# Patient Record
Sex: MALE | Race: BLACK OR AFRICAN AMERICAN | NOT HISPANIC OR LATINO | Employment: STUDENT | URBAN - METROPOLITAN AREA
[De-identification: names, ages, dates, MRNs, and addresses within clinical notes are randomized per-mention and may not be internally consistent; named-entity substitution may affect disease eponyms.]

---

## 2020-04-12 ENCOUNTER — NURSE TRIAGE (OUTPATIENT)
Dept: NURSING | Facility: CLINIC | Age: 25
End: 2020-04-12

## 2020-04-12 NOTE — TELEPHONE ENCOUNTER
"    Reason for Disposition    [1] MILD pain (e.g., does not interfere with normal activities) AND [2] pain comes and goes (cramps) [3] present > 48 hours    Additional Information    Negative: Shock suspected (e.g., cold/pale/clammy skin, too weak to stand, low BP, rapid pulse)    Negative: Difficult to awaken or acting confused (e.g., disoriented, slurred speech)    Negative: Passed out (i.e., lost consciousness, collapsed and was not responding)    Negative: Sounds like a life-threatening emergency to the triager    Negative: Chest pain    Negative: Pain is mainly in upper abdomen  (if needed ask: \"is it mainly above the belly button?\")    Negative: Followed an abdomen (stomach) injury    Negative: [1] SEVERE pain (e.g., excruciating) AND [2] present > 1 hour    Negative: [1] SEVERE pain AND [2] age > 60    Negative: [1] Vomiting AND [2] contains red blood or black (\"coffee ground\") material  (Exception: few red streaks in vomit that only happened once)    Negative: Blood in bowel movements  (Exception: Blood on surface of BM with constipation)    Negative: Black or tarry bowel movements  (Exception: chronic-unchanged  black-grey bowel movements AND is taking iron pills or Pepto-bismol)    Negative: [1] Unable to urinate (or only a few drops) > 4 hours AND [2] bladder feels very full (e.g., palpable bladder or strong urge to urinate)    Negative: [1] Pain in the scrotum or testicle AND [2] present > 1 hour    Negative: Patient sounds very sick or weak to the triager    Negative: [1] MILD-MODERATE pain AND [2] constant AND [3] present > 2 hours    Negative: [1] Vomiting AND [2] abdomen looks much more swollen than usual    Negative: [1] Vomiting AND [2] contains bile (green color)    Negative: White of the eyes have turned yellow (i.e., jaundice)    Negative: Fever > 103 F (39.4 C)    Negative: [1] Fever > 101 F (38.3 C) AND [2] age > 60    Negative: [1] Fever > 100.0 F (37.8 C) AND [2] bedridden (e.g., nursing " home patient, CVA, chronic illness, recovering from surgery)    Negative: [1] Fever > 100.0 F (37.8 C) AND [2] diabetes mellitus or weak immune system (e.g., HIV positive, cancer chemo, splenectomy, chronic steroids)    Negative: [1] SEVERE pain AND [2] present < 1 hour    Negative: [1] MODERATE pain (e.g., interferes with normal activities) AND [2] pain comes and goes (cramps) AND [3] present > 24 hours  (Exception: pain with Vomiting or Diarrhea - see that Guideline)    Protocols used: ABDOMINAL PAIN - MALE-A-AH    Patient reports mild right-sided, lower abdominal pain for the past 2 days.  He denies vomiting, he denies problems with stools/urination, and reports pain as intermittent.  Per guideline, patient is to see a provider within 24 hours.  Caller was warm transferred to scheduling to obtain a telephone visit with an available provider.    Princess Stevenson RN  [facility]

## 2020-04-13 ENCOUNTER — VIRTUAL VISIT (OUTPATIENT)
Dept: FAMILY MEDICINE | Facility: CLINIC | Age: 25
End: 2020-04-13

## 2020-04-13 ENCOUNTER — NURSE TRIAGE (OUTPATIENT)
Dept: NURSING | Facility: CLINIC | Age: 25
End: 2020-04-13

## 2020-04-13 DIAGNOSIS — K59.01 SLOW TRANSIT CONSTIPATION: Primary | ICD-10-CM

## 2020-04-13 DIAGNOSIS — Z87.2 HISTORY OF FOLLICULITIS: ICD-10-CM

## 2020-04-13 DIAGNOSIS — Z20.2 POTENTIAL EXPOSURE TO STD: ICD-10-CM

## 2020-04-13 PROCEDURE — 99203 OFFICE O/P NEW LOW 30 MIN: CPT | Mod: 95 | Performed by: FAMILY MEDICINE

## 2020-04-13 NOTE — PATIENT INSTRUCTIONS
Try Miralax, followed by Citracel. Followup if increased pain, fever; well exam 4-6 months, sooner if symptoms.

## 2020-04-13 NOTE — PROGRESS NOTES
"Angel Martin is a 24 year old male who is being evaluated via a billable phone visit.      The patient has been notified of following:     \"This phone visit will be conducted via a call between you and your physician/provider. We have found that certain health care needs can be provided without the need for an in-person physical exam.  This service lets us provide the care you need with a phone conversation.  If a prescription is necessary we can send it directly to your pharmacy.  If lab work is needed we can place an order for that and you can then stop by our lab to have the test done at a later time.    Phone visits are billed at different rates depending on your insurance coverage.  Please reach out to your insurance provider with any questions.    If during the course of the call the physician/provider feels a phone visit is not appropriate, you will not be charged for this service.\"    Patient has given verbal consent for Phone visit? Yes    How would you like to obtain your AVS? Thelma    Patient would like the phone invitation sent by: Text to cell phone: [phone]      Subjective     Angel Martin is a 24 year old male who presents to clinic today for the following health issues:    Constipation     Onset: several weeks     Description:   Frequency of bowel movements: 2-3 days.  Stool consistency: large caliber    Progression of Symptoms:  intermittent    Accompanying Signs & Symptoms:  Abdominal pain (cramping?): YES  Blood in stool: no   Rectal pain: no   Nausea/vomiting: no   Weight loss or gain: no    History:   History of abdominal surgery: no     Precipitating factors:   Recent use of narcotics, anticholinergics, calcium channel blockers, antacids, or iron supplements: no   Chronic Laxative Use: no          Therapies Tried and outcome: none      Reviewed and updated as needed this visit by Provider         Review of Systems         Objective    There were no vitals taken for this " "visit.  Estimated body mass index is 30.71 kg/m  as calculated from the following:    Height as of 4/4/14: 1.956 m (6' 5\").    Weight as of 4/4/14: 117.5 kg (259 lb).  Physical Exam   GENERAL: healthy, alert and no distress  PSYCH: mentation appears normal, affect normal/bright    Diagnostic Test Results:  Labs reviewed in Epic        Assessment & Plan     1. Slow transit constipation  recent    2. History of folliculitis  Back shaving groin    3. Potential exposure to STD  Years ago    Patient Instructions   Try Miralax, followed by Citracel. Followup if increased pain, fever; well exam 4-6 months, sooner if symptoms.     Kris Tabares MD  [facility] [place]    Kris Tabares MD      "

## 2020-04-13 NOTE — TELEPHONE ENCOUNTER
Found hard lump under R armpit - hurts to push on it or squeeze it - which went away on it's own. Patient had a virtual visit today for abdominal pain and is wondering if it could be related at all? Or a different issue that needs addressing?    Per protocol, advised homecare and monitoring - however, advised patient that message would be sent to provider who performed virtual visit to see if there are any other recommendations.    Ambika Chaudhary RN on 4/13/2020 at 6:31 PM      Additional Information    Negative: Small growth, spot, bump, or pigmented area of skin (e.g., moles, skin tags, wart, melanoma, skin cancer)    Negative: Inguinal hernia previously diagnosed by a physician    Negative: Followed a skin injury    Negative: Follows an insect bite    Negative: Swelling of lymph node suspected    Negative: Swelling of vaccination site    Negative: Swelling of tongue    Negative: Swelling of lip    Negative: Swelling of eye    Negative: Swelling of entire face    Negative: Swelling of scrotum    Negative: Swelling of labia    Negative: Swelling of surgical incision    Negative: Swelling of ankle joint    Negative: Swelling of elbow joint    Negative: Swelling of knee joint    Negative: Swelling with a skin rash    Negative: Patient sounds very sick or weak to the triager    Negative: SEVERE pain (e.g., excruciating)    Negative: [1] Swelling is painful to touch AND [2] fever    Negative: [1] Swelling is red AND [2] fever    Negative: [1] Swelling is red AND [2] size > 2 inches (5.0 cm) (Exception: itchy area of skin)    Negative: [1] Swelling of groin (inguinal area) AND [2] painful    Negative: [1] Swelling is painful to touch AND [2] no fever    Negative: Looks like a boil, infected sore, deep ulcer or other infected rash    Negative: [1] Small swelling or lump AND [2] unexplained AND [3] present > 1 week    Negative: [1] New-onset hernia suspected (reducible bulge in groin or abdomen; non-tender) AND [2] NO pain  "or vomiting    [1] Small swelling or lump AND [2] unexplained AND [3] present < 1 week    Answer Assessment - Initial Assessment Questions  1. APPEARANCE of SWELLING: \"What does it look like?\" (e.g., lymph node, insect bite, mole)      Was red, slightly raised - terrell hard  2. SIZE: \"How large is the swelling?\" (inches, cm or compare to coins)      Quarter-sized  3. LOCATION: \"Where is the swelling located?\"      R armpit  4. ONSET: \"When did the swelling start?\"      Just noticed it now  5. PAIN: \"Is it painful?\" If so, ask: \"How much?\"      Painful when push on it or squeeze it - but doesn't hurt to touch  6. ITCH: \"Does it itch?\" If so, ask: \"How much?\"      no  7. CAUSE: \"What do you think caused the swelling?\"      ?  8. OTHER SYMPTOMS: \"Do you have any other symptoms?\" (e.g., fever)      no    Protocols used: SKIN LUMP OR LOCALIZED SWELLING-A-AH      "

## 2020-04-15 NOTE — TELEPHONE ENCOUNTER
Spoke with pt, the lump opened and drained, he will continue to monitor, use warm, moist compresses a couple times daily, if any worsening he will call for further advise    Cathie Zelaya RN   [facility]

## 2020-04-15 NOTE — TELEPHONE ENCOUNTER
Agree with observing at home, warm, moist compress twice daily as needed. Usually resolve over next 6-8 weeks. Contact sooner if fever, increasing size or pain. Please notify, thanks Kris

## 2020-04-15 NOTE — TELEPHONE ENCOUNTER
Attempted to contact pt, mailbox is not set up, unable to leave a message    Cathie Zelaya RN   [facility]

## 2024-05-22 ENCOUNTER — OFFICE VISIT (OUTPATIENT)
Dept: FAMILY MEDICINE | Facility: CLINIC | Age: 29
End: 2024-05-22
Payer: COMMERCIAL

## 2024-05-22 VITALS
OXYGEN SATURATION: 98 % | HEART RATE: 73 BPM | WEIGHT: 268.5 LBS | TEMPERATURE: 97.5 F | SYSTOLIC BLOOD PRESSURE: 109 MMHG | HEIGHT: 77 IN | RESPIRATION RATE: 11 BRPM | DIASTOLIC BLOOD PRESSURE: 72 MMHG | BODY MASS INDEX: 31.7 KG/M2

## 2024-05-22 DIAGNOSIS — Z11.4 SCREENING FOR HIV (HUMAN IMMUNODEFICIENCY VIRUS): Primary | ICD-10-CM

## 2024-05-22 DIAGNOSIS — Z13.1 SCREENING FOR DIABETES MELLITUS: ICD-10-CM

## 2024-05-22 DIAGNOSIS — Z11.3 ROUTINE SCREENING FOR STI (SEXUALLY TRANSMITTED INFECTION): ICD-10-CM

## 2024-05-22 DIAGNOSIS — J45.990 EXERCISE-INDUCED ASTHMA: ICD-10-CM

## 2024-05-22 DIAGNOSIS — Z13.220 LIPID SCREENING: ICD-10-CM

## 2024-05-22 DIAGNOSIS — Z12.5 SCREENING FOR PROSTATE CANCER: ICD-10-CM

## 2024-05-22 DIAGNOSIS — Z11.59 NEED FOR HEPATITIS C SCREENING TEST: ICD-10-CM

## 2024-05-22 DIAGNOSIS — Z23 NEED FOR VACCINATION: ICD-10-CM

## 2024-05-22 DIAGNOSIS — Z00.00 ROUTINE GENERAL MEDICAL EXAMINATION AT A HEALTH CARE FACILITY: ICD-10-CM

## 2024-05-22 DIAGNOSIS — Z90.5 SOLITARY KIDNEY, ACQUIRED: ICD-10-CM

## 2024-05-22 LAB — HOLD SPECIMEN: NORMAL

## 2024-05-22 PROCEDURE — 36415 COLL VENOUS BLD VENIPUNCTURE: CPT | Performed by: FAMILY MEDICINE

## 2024-05-22 PROCEDURE — 81003 URINALYSIS AUTO W/O SCOPE: CPT | Performed by: FAMILY MEDICINE

## 2024-05-22 PROCEDURE — 87389 HIV-1 AG W/HIV-1&-2 AB AG IA: CPT | Performed by: FAMILY MEDICINE

## 2024-05-22 PROCEDURE — 80061 LIPID PANEL: CPT | Performed by: FAMILY MEDICINE

## 2024-05-22 PROCEDURE — 99385 PREV VISIT NEW AGE 18-39: CPT | Mod: 25 | Performed by: FAMILY MEDICINE

## 2024-05-22 PROCEDURE — 80053 COMPREHEN METABOLIC PANEL: CPT | Performed by: FAMILY MEDICINE

## 2024-05-22 PROCEDURE — 90715 TDAP VACCINE 7 YRS/> IM: CPT | Performed by: FAMILY MEDICINE

## 2024-05-22 PROCEDURE — 86780 TREPONEMA PALLIDUM: CPT | Performed by: FAMILY MEDICINE

## 2024-05-22 PROCEDURE — 90471 IMMUNIZATION ADMIN: CPT | Performed by: FAMILY MEDICINE

## 2024-05-22 PROCEDURE — 86803 HEPATITIS C AB TEST: CPT | Performed by: FAMILY MEDICINE

## 2024-05-22 PROCEDURE — 87491 CHLMYD TRACH DNA AMP PROBE: CPT | Performed by: FAMILY MEDICINE

## 2024-05-22 PROCEDURE — G0103 PSA SCREENING: HCPCS | Performed by: FAMILY MEDICINE

## 2024-05-22 PROCEDURE — 87591 N.GONORRHOEAE DNA AMP PROB: CPT | Performed by: FAMILY MEDICINE

## 2024-05-22 RX ORDER — ALBUTEROL SULFATE 90 UG/1
2 AEROSOL, METERED RESPIRATORY (INHALATION) EVERY 6 HOURS PRN
Qty: 18 G | Refills: 3 | Status: SHIPPED | OUTPATIENT
Start: 2024-05-22

## 2024-05-22 SDOH — HEALTH STABILITY: PHYSICAL HEALTH: ON AVERAGE, HOW MANY DAYS PER WEEK DO YOU ENGAGE IN MODERATE TO STRENUOUS EXERCISE (LIKE A BRISK WALK)?: 2 DAYS

## 2024-05-22 SDOH — HEALTH STABILITY: PHYSICAL HEALTH: ON AVERAGE, HOW MANY MINUTES DO YOU ENGAGE IN EXERCISE AT THIS LEVEL?: 60 MIN

## 2024-05-22 ASSESSMENT — PAIN SCALES - GENERAL: PAINLEVEL: NO PAIN (0)

## 2024-05-22 ASSESSMENT — SOCIAL DETERMINANTS OF HEALTH (SDOH): HOW OFTEN DO YOU GET TOGETHER WITH FRIENDS OR RELATIVES?: ONCE A WEEK

## 2024-05-22 NOTE — PROGRESS NOTES
"Preventive Care Visit  [facility]  Lalo Naqvi DO, Family Medicine  May 22, 2024      Assessment & Plan     Exercise-induced asthma  - albuterol (PROAIR HFA/PROVENTIL HFA/VENTOLIN HFA) 108 (90 Base) MCG/ACT inhaler; Inhale 2 puffs into the lungs every 6 hours as needed for shortness of breath or wheezing    Screening for HIV (human immunodeficiency virus)    Need for hepatitis C screening test  - Hepatitis C Screen Reflex to HCV RNA Quant and Genotype; Future  - Hepatitis C Screen Reflex to HCV RNA Quant and Genotype    Routine screening for STI (sexually transmitted infection)  - Treponema Abs w Reflex to RPR and Titer; Future  - HIV Antigen Antibody Combo Cascade; Future  - Chlamydia trachomatis/Neisseria gonorrhoeae by PCR; Future  - Treponema Abs w Reflex to RPR and Titer  - HIV Antigen Antibody Combo Cascade  - Chlamydia trachomatis/Neisseria gonorrhoeae by PCR    Lipid screening  - Lipid panel reflex to direct LDL Fasting; Future  - Lipid panel reflex to direct LDL Fasting    Screening for diabetes mellitus  - Comprehensive metabolic panel (BMP + Alb, Alk Phos, ALT, AST, Total. Bili, TP); Future  - Extra Purple Top EDTA (LAB USE ONLY); Future  - Comprehensive metabolic panel (BMP + Alb, Alk Phos, ALT, AST, Total. Bili, TP)  - Extra Purple Top EDTA (LAB USE ONLY)    Solitary kidney, acquired  - UA Macroscopic with reflex to Microscopic and Culture - Lab Collect; Future  - UA Macroscopic with reflex to Microscopic and Culture - Lab Collect    Need for vaccination  - TDAP 10-64Y (ADACEL,BOOSTRIX)    Routine general medical examination at a health care facility  - REVIEW OF HEALTH MAINTENANCE PROTOCOL ORDERS    Screening for prostate cancer  - PSA, screen; Future  - PSA, screen    BMI  Estimated body mass index is 32.06 kg/m  as calculated from the following:    Height as of this encounter: 1.949 m (6' 4.73\").    Weight as of this encounter: 121.8 kg (268 lb 8 oz).   Weight " management plan: Discussed healthy diet and exercise guidelines    Counseling  Appropriate preventive services were discussed with this patient, including applicable screening as appropriate for fall prevention, nutrition, physical activity, Tobacco-use cessation, weight loss and cognition.  Checklist reviewing preventive services available has been given to the patient.      Dee Ortiz is a 28 year old, presenting for the following:  Physical (STI panel) and Abdominal Pain (Abdominal pain in right side abdomen)        5/22/2024     3:08 PM   Additional Questions   Roomed by Millie TORRES        HPI    Abdominal pressure on right side and left upper quadrant   Went to ER a few years ago   Scan done   No  suspicious findings  Hasn't taken any medications for this     Bruised  kidney when young  Undescended testicle   It  was  removed      Eats once or twice a day               5/22/2024   General Health   How would you rate your overall physical health? Good   Feel stress (tense, anxious, or unable to sleep) Only a little   (!) STRESS CONCERN      5/22/2024   Nutrition   Three or more servings of calcium each day? (!) NO   Diet: Regular (no restrictions)   How many servings of fruit and vegetables per day? (!) 0-1   How many sweetened beverages each day? 0-1         5/22/2024   Exercise   Days per week of moderate/strenous exercise 2 days   Average minutes spent exercising at this level 60 min   (!) EXERCISE CONCERN      5/22/2024   Social Factors   Frequency of gathering with friends or relatives Once a week   Worry food won't last until get money to buy more No   Food not last or not have enough money for food? Yes   Do you have housing?  Yes   Are you worried about losing your housing? No   Lack of transportation? Yes   Unable to get utilities (heat,electricity)? No   (!) FOOD SECURITY CONCERN PRESENT (!) TRANSPORTATION CONCERN PRESENT      5/22/2024   Dental   Dentist two times every year? (!) NO          "5/22/2024   TB Screening   Were you born outside of the [place]? No         Today's PHQ-2 Score:       5/22/2024     2:59 PM   PHQ-2 ( 1999 Pfizer)   Q1: Little interest or pleasure in doing things 0   Q2: Feeling down, depressed or hopeless 0   PHQ-2 Score 0   Q1: Little interest or pleasure in doing things Not at all   Q2: Feeling down, depressed or hopeless Not at all   PHQ-2 Score 0           5/22/2024   Substance Use   Alcohol more than 3/day or more than 7/wk Not Applicable   Do you use any other substances recreationally? No     Social History     Tobacco Use    Smoking status: Never    Smokeless tobacco: Never   Vaping Use    Vaping status: Never Used   Substance Use Topics    Alcohol use: No    Drug use: No           5/22/2024   STI Screening   New sexual partner(s) since last STI/HIV test? (!) YES          5/22/2024   Contraception/Family Planning   Questions about contraception or family planning No        Reviewed and updated as needed this visit by Provider                       Objective    Exam  /72 (BP Location: Left arm, Patient Position: Sitting, Cuff Size: Adult Large)   Pulse 73   Temp 97.5  F (36.4  C) (Temporal)   Resp 11   Ht 1.949 m (6' 4.73\")   Wt 121.8 kg (268 lb 8 oz)   SpO2 98%   BMI 32.06 kg/m     Estimated body mass index is 32.06 kg/m  as calculated from the following:    Height as of this encounter: 1.949 m (6' 4.73\").    Weight as of this encounter: 121.8 kg (268 lb 8 oz).    Physical Exam  Constitutional:       General: He is not in acute distress.     Appearance: Normal appearance.   HENT:      Head: Normocephalic.      Right Ear: Tympanic membrane, ear canal and external ear normal.      Left Ear: Tympanic membrane, ear canal and external ear normal.      Mouth/Throat:      Mouth: Mucous membranes are moist.      Pharynx: No oropharyngeal exudate or posterior oropharyngeal erythema.   Eyes:      General: No scleral icterus.  Cardiovascular:      Rate and Rhythm: Normal " rate and regular rhythm.      Heart sounds: No murmur heard.  Pulmonary:      Effort: Pulmonary effort is normal. No respiratory distress.      Breath sounds: Normal breath sounds.   Abdominal:      General: Abdomen is flat. Bowel sounds are normal.      Palpations: Abdomen is soft.   Lymphadenopathy:      Cervical: No cervical adenopathy.   Neurological:      General: No focal deficit present.      Mental Status: He is alert.   Psychiatric:         Mood and Affect: Mood normal.         Behavior: Behavior normal.               Signed Electronically by: Lalo Naqvi DO

## 2024-05-22 NOTE — COMMUNITY RESOURCES LIST (ENGLISH)
May 22, 2024           YOUR PERSONALIZED LIST OF SERVICES & PROGRAMS           NAVIGATION    Eligibility Screening      [facility] application assistance - [facility] [place]  [address] [place] [address] (Distance: 0.9 miles)  Language: English  Fee: Free      [facility] Family Investment Program (MFIP)  [address] [place] [address] (Distance: 0.9 miles)  Language: English  Fee: Free      [place] - SNAP (formerly food stamps) Screening and Application help  Phone: ([phone]) [phone]  Website: https://www.[employer].org/programs/[place]-food-helpline/  Language: English  Hours: Mon 10:00 AM - 5:00 PM Tue 10:00 AM - 5:00 PM Wed 10:00 AM - 5:00 PM Thu 10:00 AM - 5:00 PM Fri 10:00 AM - 5:00 PM  Fee: Free  Accessibility: Ada accessible, Blind accommodation, Deaf or hard of hearing, Translation services        ASSISTANCE    Nutrition Benefits      [facility] application assistance - [facility] [place]  [address] [place] [address] (Distance: 0.9 miles)  Language: English  Fee: Free      [facility] application assistance [facility]  [address] [place] [address] (Distance: 0.9 miles)  Language: English  Fee: Free      [place] - SNAP (formerly food stamps) Screening and Application help  Phone: ([phone]  Website: https://www.[employer].org/programs/[place]-food-helpline/  Language: English  Hours: Mon 10:00 AM - 5:00 PM Tue 10:00 AM - 5:00 PM Wed 10:00 AM - 5:00 PM Thu 10:00 AM - 5:00 PM Fri 10:00 AM - 5:00 PM  Fee: Free  Accessibility: Ada accessible, Blind accommodation, Deaf or hard of hearing, Translation services    Pantry      [employer] - Food pantry  [address] [place] [address] (Distance: 4.7 miles)  Phone: [phone]) [phone]  Website: http://www.[employer].org/  Language:  English, Jamaican  Fee: Free  Accessibility: Ada accessible      [facility] - Food pantry  [address] [place] [address] [address] [place] [address] (Distance: 8.8 miles)  Phone: [phone]  Language: English, Jamaican, Stateless, Sri Lankan  Fee: Free      [employer] - [place] [employer] [employer] Shelf  Phone: ([phone]) [phone]  Website: www.[facility].org  Language: English, Jamaican  Hours: Mon 9:00 AM - 3:30 PM Tue 9:00 AM - 6:30 PM Wed 9:00 AM - 3:30 PM Thu 9:00 AM - 12:30 PM Fri 9:00 AM - 12:30 PM Sat 9:00 AM - 12:00 PM  Fee: Free            Medical Transportation, (NEMT)      [facility] - Transportation to medical appointments  [address] [place] [address] (Distance: 3.0 miles)  Phone: [phone]  Website: http://[email or web address]/  Language: English, Jamaican, Sri Lankan  Fee: Sliding scale  Accessibility: Translation services      [facility] - Transportation to medical appointments  [address] [place] [address] (Distance: 1.1 miles)  Phone: [phone]  Language: English, Jamaican, Sri Lankan  Fee: Free  Accessibility: Ada accessible, Translation services  Transportation Options: Free transportation      Social Service [facility] - Neighbor to Neighbor Program  Phone: (583) 585-9353  Email: zulema@[facility].org  Website: https://www.[facility].org/services/older-adults/-services/neighbor-to-neighbor  Language: English  Hours: Mon 8:00 AM - 5:00 PM Tue 8:00 AM - 5:00 PM Wed 8:00 AM - 5:00 PM Thu 8:00 AM - 5:00 PM Fri 8:00 AM - 5:00 PM  Fee: Insurance, Self pay  Accessibility: Deaf or hard of hearing, Blind accommodation, Translation services    Expense Assistance      [place] - Transit Link  [address] [place] [address] (Distance: 7.7 miles)  Phone: (805) 665-9376  Website: https://[employer].org/Transportation/Services/Transit-Link.aspx  Language: English  Fee: Self pay      Transit - [place] - Transit Assistance Program (TAP) -  Transportation expense assistance  [address] [place] [address] (Distance: 7.7 miles)  Language: English, Libyan  Fee: Free, Sliding scale, Self pay  Accessibility: Translation services      - Dislocated Worker/Adult [employer] Employment Program  Phone: (748) 584-8384  Email: analia@[employer]  Website: https://Jmdedu.commn"[employer]/services/employment-services/dislocated-worker-program/  Language: English, Martiniquais  Hours: Mon 8:00 AM - 4:30 PM Tue 8:00 AM - 4:30 PM Wed 8:00 AM - 4:30 PM Thu 8:00 AM - 4:30 PM Fri 8:00 AM - 4:30 PM  Fee: Free  Accessibility: Ada accessible    Coordination      Transportation - Ride coordination  [address] [place] [address] (Distance: 17.8 miles)  Website: https://POPAPP  Language: English  Fee: Self pay, Insurance  Accessibility: Ada accessible      [facility] - Bus Passes - Free or low-cost transportation  [address] [place] [address] (Distance: 2.0 miles)  Phone: (788) [phone]  Language: English  Fee: Free  Accessibility: Deaf or hard of hearing, Ada accessible      Ride Transportation - How [employer] works  Phone: (971) 533-6152  Website: https://www.[employer]/members/shop-plans/[place]-health-care-programs/blueride-transportation  Language: English  Hours: Mon 8:00 AM - 5:00 PM Tue 8:00 AM - 5:00 PM Wed 8:00 AM - 5:00 PM Thu 8:00 AM - 5:00 PM Fri 8:00 AM - 5:00 PM               IMPORTANT NUMBERS & WEBSITES        Emergency Services  911  .   United Way  211 http://211unitedway.org  .   Poison Control  (595) 713-4805 http://[place]poison.org http://wisconsinpoison.org  .     Suicide and Crisis Lifeline  988 http://988lifeline.org  .   Childhelp National Child Abuse Hotline  977.134.5519 http://Childhelphotline.org   .   National Sexual Assault Hotline  (770) 564-8036 (HOPE) http://Rainn.org   .     National Runaway Safeline  (141) 743-4815 (RUNAWAY) http://1800runaway.org  .   Pregnancy & Postpartum Support  Call/text  453-723-4335  [place]: http://ppsupportmn.org  [place]: http://Positron.com/wi  .   Substance Abuse National Helpline ([facility])  800622-HELP (8272) http://Findtreatment.gov   .                DISCLAIMER: These resources have been generated via the [employer] Platform. [employer] does not endorse any service providers mentioned in this resource list. [employer] does not guarantee that the services mentioned in this resource list will be available to you or will improve your health or wellness.    [facility]

## 2024-05-23 LAB
ALBUMIN UR-MCNC: NEGATIVE MG/DL
APPEARANCE UR: CLEAR
BILIRUB UR QL STRIP: NEGATIVE
COLOR UR AUTO: YELLOW
GLUCOSE UR STRIP-MCNC: NEGATIVE MG/DL
HGB UR QL STRIP: NEGATIVE
KETONES UR STRIP-MCNC: ABNORMAL MG/DL
LEUKOCYTE ESTERASE UR QL STRIP: NEGATIVE
NITRATE UR QL: NEGATIVE
PH UR STRIP: 6 [PH] (ref 5–7)
SP GR UR STRIP: 1.02 (ref 1–1.03)
T PALLIDUM AB SER QL: NONREACTIVE
UROBILINOGEN UR STRIP-ACNC: 0.2 E.U./DL

## 2024-05-24 ENCOUNTER — MYC MEDICAL ADVICE (OUTPATIENT)
Dept: FAMILY MEDICINE | Facility: CLINIC | Age: 29
End: 2024-05-24
Payer: COMMERCIAL

## 2024-05-24 LAB
ALBUMIN SERPL BCG-MCNC: 4.6 G/DL (ref 3.5–5.2)
ALP SERPL-CCNC: 70 U/L (ref 40–150)
ALT SERPL W P-5'-P-CCNC: 16 U/L (ref 0–70)
ANION GAP SERPL CALCULATED.3IONS-SCNC: 9 MMOL/L (ref 7–15)
AST SERPL W P-5'-P-CCNC: 20 U/L (ref 0–45)
BILIRUB SERPL-MCNC: 0.8 MG/DL
BUN SERPL-MCNC: 10.3 MG/DL (ref 6–20)
C TRACH DNA SPEC QL PROBE+SIG AMP: NEGATIVE
CALCIUM SERPL-MCNC: 9.4 MG/DL (ref 8.6–10)
CHLORIDE SERPL-SCNC: 104 MMOL/L (ref 98–107)
CHOLEST SERPL-MCNC: 149 MG/DL
CREAT SERPL-MCNC: 1.25 MG/DL (ref 0.67–1.17)
DEPRECATED HCO3 PLAS-SCNC: 27 MMOL/L (ref 22–29)
EGFRCR SERPLBLD CKD-EPI 2021: 79 ML/MIN/1.73M2
FASTING STATUS PATIENT QL REPORTED: YES
FASTING STATUS PATIENT QL REPORTED: YES
GLUCOSE SERPL-MCNC: 87 MG/DL (ref 70–99)
HCV AB SERPL QL IA: NONREACTIVE
HDLC SERPL-MCNC: 38 MG/DL
HIV 1+2 AB+HIV1 P24 AG SERPL QL IA: NONREACTIVE
LDLC SERPL CALC-MCNC: 99 MG/DL
N GONORRHOEA DNA SPEC QL NAA+PROBE: NEGATIVE
NONHDLC SERPL-MCNC: 111 MG/DL
POTASSIUM SERPL-SCNC: 4.1 MMOL/L (ref 3.4–5.3)
PROT SERPL-MCNC: 7.8 G/DL (ref 6.4–8.3)
PSA SERPL DL<=0.01 NG/ML-MCNC: 0.63 NG/ML
SODIUM SERPL-SCNC: 140 MMOL/L (ref 135–145)
TRIGL SERPL-MCNC: 62 MG/DL

## 2024-05-31 NOTE — PATIENT INSTRUCTIONS
"Preventive Care Advice   This is general advice we often give to help people stay healthy. Your care team may have specific advice just for you. Please talk to your care team about your own preventive care needs.  Lifestyle  Exercise at least 150 minutes each week (30 minutes a day, 5 days a week).  Do muscle strengthening activities 2 days a week. These help control your weight and prevent disease.  No smoking.  Wear sunscreen to prevent skin cancer.  Have your home tested for radon every 2 to 5 years. Radon is a colorless, odorless gas that can harm your lungs. To learn more, go to www.health.[facility]. and search for \"Radon in Homes.\"  Keep guns unloaded and locked up in a safe place like a safe or gun vault, or, use a gun lock and hide the keys. Always lock away bullets separately. To learn more, visit [place].gov and search for \"safe gun storage.\"  Nutrition  Eat 5 or more servings of fruits and vegetables each day.  Try wheat bread, brown rice and whole grain pasta (instead of white bread, rice, and pasta).  Get enough calcium and vitamin D. Check the label on foods and aim for 100% of the RDA (recommended daily allowance).  Regular exams  Have a dental exam and cleaning every 6 months.  See your health care team every year to talk about:  Any changes in your health.  Any medicines your care team has prescribed.  Preventive care, family planning, and ways to prevent chronic diseases.  Shots (vaccines)   HPV shots (up to age 26), if you've never had them before.  Hepatitis B shots (up to age 59), if you've never had them before.  COVID-19 shot: Get this shot when it's due.  Flu shot: Get a flu shot every year.  Tetanus shot: Get a tetanus shot every 10 years.  Pneumococcal, hepatitis A, and RSV shots: Ask your care team if you need these based on your risk.  Shingles shot (for age 50 and up).  General health tests  Diabetes screening:  Starting at age 35, Get screened for diabetes at least every 3 years.  If " you are younger than age 35, ask your care team if you should be screened for diabetes.  Cholesterol test: At age 39, start having a cholesterol test every 5 years, or more often if advised.  Bone density scan (DEXA): At age 50, ask your care team if you should have this scan for osteoporosis (brittle bones).  Hepatitis C: Get tested at least once in your life.  Abdominal aortic aneurysm screening: Talk to your doctor about having this screening if you:  Have ever smoked; and  Are biologically male; and  Are between the ages of 65 and 75.  STIs (sexually transmitted infections)  Before age 24: Ask your care team if you should be screened for STIs.  After age 24: Get screened for STIs if you're at risk. You are at risk for STIs (including HIV) if:  You are sexually active with more than one person.  You don't use condoms every time.  You or a partner was diagnosed with a sexually transmitted infection.  If you are at risk for HIV, ask about PrEP medicine to prevent HIV.  Get tested for HIV at least once in your life, whether you are at risk for HIV or not.  Cancer screening tests  Cervical cancer screening: If you have a cervix, begin getting regular cervical cancer screening tests at age 21. Most people who have regular screenings with normal results can stop after age 65. Talk about this with your provider.  Breast cancer scan (mammogram): If you've ever had breasts, begin having regular mammograms starting at age 40. This is a scan to check for breast cancer.  Colon cancer screening: It is important to start screening for colon cancer at age 45.  Have a colonoscopy test every 10 years (or more often if you're at risk) Or, ask your provider about stool tests like a FIT test every year or Cologuard test every 3 years.  To learn more about your testing options, visit: www.[employer]/366270.pdf.  For help making a decision, visit: ori/fz55399.  Prostate cancer screening test: If you have a prostate and are age 55  to 69, ask your provider if you would benefit from a yearly prostate cancer screening test.  Lung cancer screening: If you are a current or former smoker age 50 to 80, ask your care team if ongoing lung cancer screenings are right for you.  For informational purposes only. Not to replace the advice of your health care provider. Copyright   2023 [employer]. All rights reserved. Clinically reviewed by the [facility] Transitions Program. [employer] 933160 - REV 04/24.

## 2024-06-12 ENCOUNTER — OFFICE VISIT (OUTPATIENT)
Dept: FAMILY MEDICINE | Facility: CLINIC | Age: 29
End: 2024-06-12
Payer: COMMERCIAL

## 2024-06-12 VITALS
RESPIRATION RATE: 14 BRPM | OXYGEN SATURATION: 98 % | HEIGHT: 77 IN | BODY MASS INDEX: 30.58 KG/M2 | WEIGHT: 259 LBS | DIASTOLIC BLOOD PRESSURE: 76 MMHG | HEART RATE: 85 BPM | SYSTOLIC BLOOD PRESSURE: 112 MMHG | TEMPERATURE: 98.9 F

## 2024-06-12 DIAGNOSIS — Q53.10 UNILATERAL UNDESCENDED TESTICLE, UNSPECIFIED LOCATION: Primary | ICD-10-CM

## 2024-06-12 PROCEDURE — 99213 OFFICE O/P EST LOW 20 MIN: CPT | Performed by: FAMILY MEDICINE

## 2024-06-12 PROCEDURE — G2211 COMPLEX E/M VISIT ADD ON: HCPCS | Performed by: FAMILY MEDICINE

## 2024-06-12 ASSESSMENT — ASTHMA QUESTIONNAIRES
QUESTION_2 LAST FOUR WEEKS HOW OFTEN HAVE YOU HAD SHORTNESS OF BREATH: ONCE OR TWICE A WEEK
ACT_TOTALSCORE: 23
QUESTION_4 LAST FOUR WEEKS HOW OFTEN HAVE YOU USED YOUR RESCUE INHALER OR NEBULIZER MEDICATION (SUCH AS ALBUTEROL): NOT AT ALL
ACT_TOTALSCORE: 23
QUESTION_5 LAST FOUR WEEKS HOW WOULD YOU RATE YOUR ASTHMA CONTROL: WELL CONTROLLED
QUESTION_3 LAST FOUR WEEKS HOW OFTEN DID YOUR ASTHMA SYMPTOMS (WHEEZING, COUGHING, SHORTNESS OF BREATH, CHEST TIGHTNESS OR PAIN) WAKE YOU UP AT NIGHT OR EARLIER THAN USUAL IN THE MORNING: NOT AT ALL
QUESTION_1 LAST FOUR WEEKS HOW MUCH OF THE TIME DID YOUR ASTHMA KEEP YOU FROM GETTING AS MUCH DONE AT WORK, SCHOOL OR AT HOME: NONE OF THE TIME

## 2024-06-12 NOTE — PROGRESS NOTES
"  Assessment & Plan     Unilateral undescended testicle, unspecified location  - Adult Urology  Referral; Future        MED REC REQUIRED  Post Medication Reconciliation Status:       Dee Ortiz is a 28 year old, presenting for the following health issues:    Follow Up (TEST RESULTS FROM PREVIOUS VISIT-WOULD LIKE TO LOOK AT KIDNEY ONES WITH HISTORY OF SOME PROBLEMS), Referral (GI-PUT ON METAMUCIL BUT PAIN IS ACTUALLY GOTTEN WORSE), and Testicular Problem (HAD ORCHIOPEXY WHEN YOUNGER-TOLD AT INCREASED RISK FOR TESTICULAR CANCER SO WOULD LIKE TO TALK ABOUT THIS)      6/12/2024     3:31 PM   Additional Questions   Roomed by CHUCHO LEONE     He is having bowel movements every other day on metamucil     For last few days he is having increased pain and he is on a juice cleanse   All pureed fruits and veggies   Not drinking much water right now.    Has 2 weeks longer on juice cleanse   Will drink more water to determine if this  is constipation           Objective    /76 (BP Location: Right arm, Patient Position: Sitting, Cuff Size: Adult Large)   Pulse 85   Temp 98.9  F (37.2  C) (Temporal)   Resp 14   Ht 1.949 m (6' 4.73\")   Wt 117.5 kg (259 lb)   SpO2 98%   BMI 30.93 kg/m    Body mass index is 30.93 kg/m .  Physical Exam  Constitutional:       General: He is not in acute distress.  Eyes:      General: No scleral icterus.  Pulmonary:      Effort: No respiratory distress.   Genitourinary:     Comments: Left testicle could not be located in scrotum or inguinal canal.    Neurological:      Mental Status: He is alert.   Psychiatric:         Mood and Affect: Mood normal.         Behavior: Behavior normal.                Signed Electronically by: Lalo Naqvi DO    "

## 2024-06-20 DIAGNOSIS — Z87.19 HISTORY OF COLITIS: ICD-10-CM

## 2024-06-20 DIAGNOSIS — R10.11 ABDOMINAL PAIN, RIGHT UPPER QUADRANT: Primary | ICD-10-CM

## 2024-07-12 ENCOUNTER — MYC MEDICAL ADVICE (OUTPATIENT)
Dept: FAMILY MEDICINE | Facility: CLINIC | Age: 29
End: 2024-07-12
Payer: COMMERCIAL

## 2024-07-12 ENCOUNTER — VIRTUAL VISIT (OUTPATIENT)
Dept: FAMILY MEDICINE | Facility: CLINIC | Age: 29
End: 2024-07-12
Payer: COMMERCIAL

## 2024-07-12 DIAGNOSIS — Z87.19 HISTORY OF COLITIS: Primary | ICD-10-CM

## 2024-07-12 PROCEDURE — 99213 OFFICE O/P EST LOW 20 MIN: CPT | Mod: 95 | Performed by: FAMILY MEDICINE

## 2024-07-12 NOTE — PROGRESS NOTES
Angel is a 28 year old who is being evaluated via a billable video visit.          Assessment & Plan     History of colitis  - Adult GI  Referral - Procedure Only; Future      Subjective   Angel is a 28 year old, presenting for the following health issues:  Abdominal Pain    HPI     Colitis     Testicle, undescended   [facility]     Herpes   Several questions to transmissibility             Objective           Vitals:  No vitals were obtained today due to virtual visit.    Physical Exam   GENERAL: alert and no distress  EYES: Eyes grossly normal to inspection.  No discharge or erythema, or obvious scleral/conjunctival abnormalities.  RESP: No audible wheeze, cough, or visible cyanosis.    SKIN: Visible skin clear. No significant rash, abnormal pigmentation or lesions.  NEURO: Cranial nerves grossly intact.  Mentation and speech appropriate for age.  PSYCH: Appropriate affect, tone, and pace of words        Video-Visit Details    Type of service:  Video Visit   Originating Location (pt. Location): Home    Distant Location (provider location):  On-site  Platform used for Video Visit: SpeechTrans  [id number]  Signed Electronically by: Lalo Naqvi DO

## 2024-07-22 ENCOUNTER — HOSPITAL ENCOUNTER (OUTPATIENT)
Dept: CT IMAGING | Facility: CLINIC | Age: 29
Discharge: HOME | End: 2024-07-22
Attending: FAMILY MEDICINE | Admitting: FAMILY MEDICINE
Payer: COMMERCIAL

## 2024-07-22 DIAGNOSIS — Z87.19 HISTORY OF COLITIS: ICD-10-CM

## 2024-07-22 DIAGNOSIS — R10.11 ABDOMINAL PAIN, RIGHT UPPER QUADRANT: ICD-10-CM

## 2024-07-22 PROCEDURE — 250N000011 HC RX IP 250 OP 636: Performed by: FAMILY MEDICINE

## 2024-07-22 PROCEDURE — 74177 CT ABD & PELVIS W/CONTRAST: CPT | Mod: 26 | Performed by: RADIOLOGY

## 2024-07-22 PROCEDURE — 999N000127 HC STATISTIC PERIPHERAL IV START W US GUIDANCE

## 2024-07-22 PROCEDURE — 74177 CT ABD & PELVIS W/CONTRAST: CPT

## 2024-07-22 PROCEDURE — 250N000009 HC RX 250: Performed by: FAMILY MEDICINE

## 2024-07-22 PROCEDURE — 999N000040 HC STATISTIC CONSULT NO CHARGE VASC ACCESS

## 2024-07-22 RX ORDER — IOPAMIDOL 755 MG/ML
100 INJECTION, SOLUTION INTRAVASCULAR ONCE
Status: COMPLETED | OUTPATIENT
Start: 2024-07-22 | End: 2024-07-22

## 2024-07-22 RX ADMIN — IOPAMIDOL 126 ML: 755 INJECTION, SOLUTION INTRAVENOUS at 16:28

## 2024-07-22 RX ADMIN — SODIUM CHLORIDE 64 ML: 9 INJECTION, SOLUTION INTRAVENOUS at 16:28

## 2024-07-22 NOTE — CONSULTS
"Consult received for Vascular access care.  See LDA for details.  For additional needs place \"Nursing to Consult for Vascular Access\" [id number] order in EPIC.  "

## 2025-07-06 ENCOUNTER — HEALTH MAINTENANCE LETTER (OUTPATIENT)
Age: 30
End: 2025-07-06